# Patient Record
Sex: FEMALE | Race: WHITE | NOT HISPANIC OR LATINO | URBAN - METROPOLITAN AREA
[De-identification: names, ages, dates, MRNs, and addresses within clinical notes are randomized per-mention and may not be internally consistent; named-entity substitution may affect disease eponyms.]

---

## 2019-06-18 NOTE — PATIENT DISCUSSION
POAG, OU:  INTRAOCULAR PRESSURE IS WITHIN ACCEPTABLE LIMITS. PATIENT INSTRUCTED TO CONTINUE _LATANOPROST QHS OD_______ AND RETURN FOR FOLLOW-UP AS SCHEDULED.

## 2020-06-16 NOTE — PATIENT DISCUSSION
POAG, OU:  INTRAOCULAR PRESSURE IS WITHIN ACCEPTABLE LIMITS. PATIENT INSTRUCTED TO CONTINUE __LATANOPROST QHS OD ______ AND RETURN FOR FOLLOW-UP AS SCHEDULED.

## 2020-08-28 NOTE — PATIENT DISCUSSION
CHRONIC TOTAL RETINAL DETACHMENT OS: STABLE. NO TREATMENT INDICATED AT THIS TIME. RETURN FOR FOLLOW-UP AS SCHEDULED.

## 2020-08-28 NOTE — PATIENT DISCUSSION
RETINA IS ATTACHED OD: PVD; S/P LASER RETINOPEXY OD; NO HOLES OR TEARS SEEN ON DILATED EXAM TODAY.  RETINAL DETACHMENT SIGNS AND SYMPTOMS REVIEWED

## 2020-12-15 NOTE — PATIENT DISCUSSION
POAG, OU:  INTRAOCULAR PRESSURE IS WITHIN ACCEPTABLE LIMITS. PATIENT INSTRUCTED TO CONTINUE _LATANOPROST__ AND RETURN FOR FOLLOW-UP AS SCHEDULED.

## 2021-01-20 NOTE — PATIENT DISCUSSION
CATARACTS, Od - VISUALLY SIGNIFICANT. SCHEDULE _OD DISCUSSED OPTION OF ___TORIC___VS __STANDARD IOL_. PATIENT UNDERSTANDS AND DESIRES __TO THINK ABOUT AND DECIDE PRIOR TO SURGERY_.   ALSO DISCUSSED OPTION AND RECOMMENDATION OF ISTENT OD.

## 2021-01-20 NOTE — PATIENT DISCUSSION
Surgery Counseling: I have discussed the option of scheduling surgery versus following, as well as the risks, benefits and alternatives of cataract surgery with the patient. It was explained that the surgery is medically indicated at this time, and it can be performed at the patient's option as delaying will cause no further deterioration, therefore there is no rush and there is no harm in waiting to have surgery. It was also explained that there is no guarantee that removing the cataract will improve their vision. The patient understands and desires to proceed with cataract surgery with the implantation of an intraocular lens to improve vision for _____driving. I have given the patient the prescribed regimen of the all-in-one drop to use before and after cataract surgery. They have elected to use the all-in-one option of Pred/Gati/Brom(prednisolone acetate,gatifloxacin,and bromfenac. Patient to administer as directed.

## 2021-02-16 NOTE — PATIENT DISCUSSION
02/16/2021\[facility]\""[facility] [place]+[phone]&nbsp;SN &nbsp; &nbsp; rgr
1 DROP INTO BOTH EYES AT BEDTIME
Anti-reflective
Bifocal - Daily wearODplanosphere+[phone] -2&nbsp;SN &nbsp; &nbsp; rgr
COUNSELING:
Comments: ONLY USING IN OD
Comments:cataract surgery
Continue: latanoprost (latanoprost): drops: 0.005% 09-
Expiration:02/16/2021
General:
Glasses Prescribed:
Lens Material:
Lens Treatment:
Medications:
POAG, OU:  INTRAOCULAR PRESSURE IS WITHIN ACCEPTABLE LIMITS. PATIENT INSTRUCTED TO CONTINUE LATANOPROST_______ AND RETURN FOR FOLLOW-UP AS SCHEDULED.
Post-Op Instructions: The patient was instructed in the proper use of post-operative eye drop: continue pred-gati-brom in the surgical eye for a total of 3 weeks use then discontinue. Call back instructions, retinal detachment and endophthalmitis precautions given.
Primary Open Angle Glaucoma Counseling: I have reviewed the regimen of glaucoma drops with the patient and have stressed the importance of compliance. Patient instructed to continue present medication and return for follow-up as scheduled.
S/P PE IOL, _OD_. DOING WELL. CONTINUE DROPS AS DIRECTED. SPECS RX OFFERED. RX ARC IN SPECS TO MINIMIZE GLARE. RETURN FOR FOLLOW-UP AS SCHEDULED.
Slab Off:No
UV Protection
Vertex Distance:
spherecylinderaxisaddprismvertexVAInt VANVExaminer
Yes

## 2021-09-23 NOTE — PATIENT DISCUSSION
Comments: ONLY USING IN OD Cyclophosphamide Counseling:  I discussed with the patient the risks of cyclophosphamide including but not limited to hair loss, hormonal abnormalities, decreased fertility, abdominal pain, diarrhea, nausea and vomiting, bone marrow suppression and infection. The patient understands that monitoring is required while taking this medication.

## 2021-12-08 ENCOUNTER — PREPPED CHART (OUTPATIENT)
Dept: URBAN - METROPOLITAN AREA CLINIC 20 | Facility: CLINIC | Age: 81
End: 2021-12-08

## 2021-12-08 NOTE — PATIENT DISCUSSION
WILL CONTINUE TO OBSERVE FOR NOW. WILL PROCEED WITH YAG WHEN THINK IT AFFECTING HER VISION ENOUGH TO IMPROVE VISUAL ACUITY.

## 2022-02-11 ENCOUNTER — FOLLOW UP (OUTPATIENT)
Dept: URBAN - METROPOLITAN AREA CLINIC 20 | Facility: CLINIC | Age: 82
End: 2022-02-11

## 2022-02-11 DIAGNOSIS — H35.3132: ICD-10-CM

## 2022-02-11 DIAGNOSIS — H40.1132: ICD-10-CM

## 2022-02-11 PROCEDURE — 92014 COMPRE OPH EXAM EST PT 1/>: CPT

## 2022-02-11 PROCEDURE — 92250 FUNDUS PHOTOGRAPHY W/I&R: CPT

## 2022-02-11 ASSESSMENT — TONOMETRY
OS_IOP_MMHG: 14
OD_IOP_MMHG: 11

## 2022-02-11 ASSESSMENT — VISUAL ACUITY
OS_SC: 20/40-1
OD_SC: 20/30-2
OU_SC: 20/25-1

## 2022-02-11 NOTE — PATIENT DISCUSSION
The ophthalmic risks of chronic hydroxychloroquine therapy include, but are not limited to; abnormal color vision, decreased central vision, and difficulty adjusting to dim light. Yearly dilated exams are necessary to monitor for toxicity. The patient is encouraged to call back with any visual disturbance, and to keep follow-up appointments as scheduled.

## 2022-02-11 NOTE — PATIENT DISCUSSION
[facility] monitoring, CONTINUE: KAR vitamins, no smoking, green leafy vegetables discussed.

## 2022-02-16 NOTE — PATIENT DISCUSSION
Discussed DT1 and P1 toric, pt interested in trying new lenses. Order p1 toric and disp to pt, if DT1 available order as well. Pt OK to  and call if happy with lenses.

## 2022-06-10 ENCOUNTER — FOLLOW UP (OUTPATIENT)
Dept: URBAN - METROPOLITAN AREA CLINIC 20 | Facility: CLINIC | Age: 82
End: 2022-06-10

## 2022-06-10 DIAGNOSIS — H35.3132: ICD-10-CM

## 2022-06-10 DIAGNOSIS — H40.1132: ICD-10-CM

## 2022-06-10 PROCEDURE — 92133 CPTRZD OPH DX IMG PST SGM ON: CPT

## 2022-06-10 PROCEDURE — 99213 OFFICE O/P EST LOW 20 MIN: CPT

## 2022-06-10 ASSESSMENT — VISUAL ACUITY
OU_SC: 20/40+2
OS_PH: 20/40
OD_SC: 20/40-1
OD_PH: 20/40
OU_SC: J1
OS_SC: 20/40-1

## 2022-06-10 ASSESSMENT — TONOMETRY
OS_IOP_MMHG: 12
OD_IOP_MMHG: 12
OD_IOP_MMHG: 12
OS_IOP_MMHG: 11

## 2022-06-10 NOTE — PATIENT DISCUSSION
[facility] monitoring, CONTINUE: KAR vitamins, no smoking, green leafy vegetables discussed.

## 2022-06-10 NOTE — PATIENT DISCUSSION
** Patient is noticing some vision at certain times is getting dimmer.  The PCO grade may have something to do with it.---  The patient states she would like to wait a few more months and recheck.  Will also dilate at that visit to recheck AMD.

## 2022-10-21 ENCOUNTER — COMPREHENSIVE EXAM (OUTPATIENT)
Dept: URBAN - METROPOLITAN AREA CLINIC 20 | Facility: CLINIC | Age: 82
End: 2022-10-21

## 2022-10-21 DIAGNOSIS — H35.3132: ICD-10-CM

## 2022-10-21 DIAGNOSIS — H40.1132: ICD-10-CM

## 2022-10-21 PROCEDURE — 92250 FUNDUS PHOTOGRAPHY W/I&R: CPT

## 2022-10-21 PROCEDURE — 99214 OFFICE O/P EST MOD 30 MIN: CPT

## 2022-10-21 ASSESSMENT — VISUAL ACUITY
OU_SC: 20/40+1
OD_SC: 20/40+2
OD_PH: 20/40
OS_PH: 20/40
OS_SC: 20/40-2

## 2022-10-21 ASSESSMENT — TONOMETRY
OS_IOP_MMHG: 14
OD_IOP_MMHG: 13

## 2022-10-21 NOTE — PATIENT DISCUSSION
[facility] monitoring, CONTINUE: KAR vitamins, no smoking, green leafy vegetables discussed.

## 2023-01-20 NOTE — PATIENT DISCUSSION
CHRONIC TOTAL RETINAL DETACHMENT OS: STABLE. NO TREATMENT INDICATED AT THIS TIME. RETURN FOR FOLLOW-UP AS SCHEDULED.
CLEARED FOR PHACO OD.
COUNSELING:
Considering  Surgery Counseling: I have discussed the option of scheduling surgery versus following, as well as the risks, benefits and alternatives of cataract surgery with the patient. It was explained that the surgery is elective, there is no rush and there is no harm in waiting to have surgery. It was also explained that there is no guarantee that removing the cataract will improve their vision. The patient understands and desires to follow for now and will consider his/her options.
Continue: latanoprost (latanoprost): drops: 0.005% 1 drop at bedtime into both eyes 06-
DISLOCATION OF IOL, OS: CONTINUE TO OBSERVE. DISCUSSED VERY GUARDED VISUAL PROGNOSIS.
General:
Medications:
Posterior Vitreous Detachment: I have discussed the diagnosis of PVD with the patient and the possibility of a retinal tear or detachment. The signs/symptoms of a retinal tear/detachment were reviewed to include but not limited to redness, discharge, pain, increase or change in flashes of light, increase or change in floaters, decreased vision, part of the vision missing, veils or any other ocular concerns. I have further explained not all patients who develop a tear or detachment have notable symptoms, therefore, compliance with return visits are necessary. The patient was instructed to contact us immediately if they noticed any of the signs or symptoms of retinal detachment as noted above as the prognosis for any potential treatment options may be time related. Return for follow-up as scheduled.
RETINA IS ATTACHED OD: PVD; S/P LASER RETINOPEXY OD; NO HOLES OR TEARS SEEN ON DILATED EXAM TODAY.  RETINAL DETACHMENT SIGNS AND SYMPTOMS REVIEWED
REVIEWED PVD PRECAUTIONS WITH PATIENT AND ADVISED TO CALL IF EXPERIENCES NEW FLASHES OF LIGHT, INCREASE IN FLOATERS, OR DECREASE VISION IN EITHER EYE.
(2) potential problem

## 2023-04-14 ENCOUNTER — FOLLOW UP (OUTPATIENT)
Dept: URBAN - METROPOLITAN AREA CLINIC 20 | Facility: CLINIC | Age: 83
End: 2023-04-14

## 2023-04-14 DIAGNOSIS — H26.492: ICD-10-CM

## 2023-04-14 DIAGNOSIS — H40.1132: ICD-10-CM

## 2023-04-14 DIAGNOSIS — Z79.620: ICD-10-CM

## 2023-04-14 DIAGNOSIS — H35.3132: ICD-10-CM

## 2023-04-14 PROCEDURE — 66821 AFTER CATARACT LASER SURGERY: CPT

## 2023-04-14 PROCEDURE — 92014 COMPRE OPH EXAM EST PT 1/>: CPT

## 2023-04-14 PROCEDURE — 92134 CPTRZ OPH DX IMG PST SGM RTA: CPT

## 2023-04-14 ASSESSMENT — VISUAL ACUITY
OU_SC: J2
OD_SC: 20/40
OS_PH: 20/50-1
OS_SC: 20/60
OU_SC: 20/40-1

## 2023-04-14 ASSESSMENT — TONOMETRY
OD_IOP_MMHG: 10
OS_IOP_MMHG: 8

## 2023-05-03 ENCOUNTER — FOLLOW UP (OUTPATIENT)
Dept: URBAN - METROPOLITAN AREA CLINIC 20 | Facility: CLINIC | Age: 83
End: 2023-05-03

## 2023-05-03 DIAGNOSIS — Z98.890: ICD-10-CM

## 2023-05-03 PROCEDURE — 99024 POSTOP FOLLOW-UP VISIT: CPT

## 2023-05-03 ASSESSMENT — VISUAL ACUITY
OD_SC: 20/40
OU_SC: 20/40+2
OS_SC: 20/40+2

## 2023-05-03 ASSESSMENT — TONOMETRY
OD_IOP_MMHG: 13
OS_IOP_MMHG: 14